# Patient Record
Sex: FEMALE | Race: WHITE
[De-identification: names, ages, dates, MRNs, and addresses within clinical notes are randomized per-mention and may not be internally consistent; named-entity substitution may affect disease eponyms.]

---

## 2019-01-11 ENCOUNTER — HOSPITAL ENCOUNTER (EMERGENCY)
Dept: HOSPITAL 62 - ER | Age: 29
Discharge: HOME | End: 2019-01-11
Payer: OTHER GOVERNMENT

## 2019-01-11 VITALS — DIASTOLIC BLOOD PRESSURE: 59 MMHG | SYSTOLIC BLOOD PRESSURE: 104 MMHG

## 2019-01-11 DIAGNOSIS — R55: ICD-10-CM

## 2019-01-11 DIAGNOSIS — R19.7: Primary | ICD-10-CM

## 2019-01-11 DIAGNOSIS — R11.2: ICD-10-CM

## 2019-01-11 DIAGNOSIS — Z88.6: ICD-10-CM

## 2019-01-11 LAB
ADD MANUAL DIFF: NO
ALBUMIN SERPL-MCNC: 4.8 G/DL (ref 3.5–5)
ALP SERPL-CCNC: 40 U/L (ref 38–126)
ALT SERPL-CCNC: 22 U/L (ref 9–52)
ANION GAP SERPL CALC-SCNC: 10 MMOL/L (ref 5–19)
APPEARANCE UR: (no result)
APTT PPP: (no result) S
AST SERPL-CCNC: 20 U/L (ref 14–36)
BASOPHILS # BLD AUTO: 0.1 10^3/UL (ref 0–0.2)
BASOPHILS NFR BLD AUTO: 0.3 % (ref 0–2)
BILIRUB DIRECT SERPL-MCNC: 0.1 MG/DL (ref 0–0.4)
BILIRUB SERPL-MCNC: 0.8 MG/DL (ref 0.2–1.3)
BILIRUB UR QL STRIP: NEGATIVE
BUN SERPL-MCNC: 15 MG/DL (ref 7–20)
CALCIUM: 9.8 MG/DL (ref 8.4–10.2)
CHLORIDE SERPL-SCNC: 104 MMOL/L (ref 98–107)
CK MB SERPL-MCNC: < 0.22 NG/ML (ref ?–4.55)
CK SERPL-CCNC: 66 U/L (ref 30–135)
CO2 SERPL-SCNC: 26 MMOL/L (ref 22–30)
EOSINOPHIL # BLD AUTO: 0.4 10^3/UL (ref 0–0.6)
EOSINOPHIL NFR BLD AUTO: 2.1 % (ref 0–6)
ERYTHROCYTE [DISTWIDTH] IN BLOOD BY AUTOMATED COUNT: 12.4 % (ref 11.5–14)
GLUCOSE SERPL-MCNC: 122 MG/DL (ref 75–110)
GLUCOSE UR STRIP-MCNC: NEGATIVE MG/DL
HCT VFR BLD CALC: 42.1 % (ref 36–47)
HGB BLD-MCNC: 14.5 G/DL (ref 12–15.5)
KETONES UR STRIP-MCNC: NEGATIVE MG/DL
LYMPHOCYTES # BLD AUTO: 1.8 10^3/UL (ref 0.5–4.7)
LYMPHOCYTES NFR BLD AUTO: 10.1 % (ref 13–45)
MCH RBC QN AUTO: 31.4 PG (ref 27–33.4)
MCHC RBC AUTO-ENTMCNC: 34.4 G/DL (ref 32–36)
MCV RBC AUTO: 91 FL (ref 80–97)
MONOCYTES # BLD AUTO: 0.8 10^3/UL (ref 0.1–1.4)
MONOCYTES NFR BLD AUTO: 4.4 % (ref 3–13)
NEUTROPHILS # BLD AUTO: 15.1 10^3/UL (ref 1.7–8.2)
NEUTS SEG NFR BLD AUTO: 83.1 % (ref 42–78)
NITRITE UR QL STRIP: NEGATIVE
PH UR STRIP: 5 [PH] (ref 5–9)
PLATELET # BLD: 277 10^3/UL (ref 150–450)
POTASSIUM SERPL-SCNC: 4.2 MMOL/L (ref 3.6–5)
PROT SERPL-MCNC: 7.7 G/DL (ref 6.3–8.2)
PROT UR STRIP-MCNC: 30 MG/DL
RBC # BLD AUTO: 4.61 10^6/UL (ref 3.72–5.28)
SODIUM SERPL-SCNC: 139.5 MMOL/L (ref 137–145)
SP GR UR STRIP: 1.03
TOTAL CELLS COUNTED % (AUTO): 100 %
TROPONIN I SERPL-MCNC: < 0.012 NG/ML
UROBILINOGEN UR-MCNC: 2 MG/DL (ref ?–2)
WBC # BLD AUTO: 18.2 10^3/UL (ref 4–10.5)

## 2019-01-11 PROCEDURE — 81001 URINALYSIS AUTO W/SCOPE: CPT

## 2019-01-11 PROCEDURE — 82550 ASSAY OF CK (CPK): CPT

## 2019-01-11 PROCEDURE — 99284 EMERGENCY DEPT VISIT MOD MDM: CPT

## 2019-01-11 PROCEDURE — 82553 CREATINE MB FRACTION: CPT

## 2019-01-11 PROCEDURE — 85025 COMPLETE CBC W/AUTO DIFF WBC: CPT

## 2019-01-11 PROCEDURE — 96374 THER/PROPH/DIAG INJ IV PUSH: CPT

## 2019-01-11 PROCEDURE — 84484 ASSAY OF TROPONIN QUANT: CPT

## 2019-01-11 PROCEDURE — 36415 COLL VENOUS BLD VENIPUNCTURE: CPT

## 2019-01-11 PROCEDURE — 93005 ELECTROCARDIOGRAM TRACING: CPT

## 2019-01-11 PROCEDURE — 71045 X-RAY EXAM CHEST 1 VIEW: CPT

## 2019-01-11 PROCEDURE — 96375 TX/PRO/DX INJ NEW DRUG ADDON: CPT

## 2019-01-11 PROCEDURE — 80053 COMPREHEN METABOLIC PANEL: CPT

## 2019-01-11 PROCEDURE — 96361 HYDRATE IV INFUSION ADD-ON: CPT

## 2019-01-11 PROCEDURE — 84703 CHORIONIC GONADOTROPIN ASSAY: CPT

## 2019-01-11 PROCEDURE — 93010 ELECTROCARDIOGRAM REPORT: CPT

## 2019-01-11 NOTE — RADIOLOGY REPORT (SQ)
EXAM DESCRIPTION: 



XR CHEST 1 VIEW



COMPLETED DATE/TME:  01/11/2019 02:50



CLINICAL HISTORY: 



28 years, Female, syncope



COMPARISON:

None.



NUMBER OF VIEWS:

1



TECHNIQUE:

Portable chest



LIMITATIONS:

None.



FINDINGS:



Heart size is normal. Lungs are clear. No pneumothorax



IMPRESSION:



Negative chest

 



copyright 2011 [employer]- All Rights Reserved

## 2019-01-11 NOTE — ER DOCUMENT REPORT
ED General





- General


Chief Complaint: Syncope


Stated Complaint: BLACKING OUT


Time Seen by Provider: 19 02:54


Notes: 





Patient is a 28-year-old female that presents to the emergency department for 

chief complaint of nausea, vomiting, diarrhea and syncopal episode.  Patient 

states that she got up to go the bathroom early this morning, and had watery 

diarrhea, after getting out of the bathroom and walking back towards the bed, 

she felt lightheaded and told her  that she felt like she is going to 

pass out, and did have a brief syncopal episode for a few seconds.  Her  

was able to catch her, she did not strike her head or injure her neck.  She 

states she has had passing out episodes in the past with is been a few years.  

She is had associated nausea and vomiting, that she had afterwards, and she is 

had multiple episodes of diarrhea since her initial episode.  She states she 

went out to eat last night, and ate a steak quesadilla at a restaurant.  No sick

contacts that she is aware of.  She denies having any recent fever, chills, 

night sweats, abdominal pain, dysuria or hematuria.





Past Medical History: Denies chronic medical conditions


Past Surgical History: Appendectomy, 


Social History: Denies tobacco use, admits to rare alcohol use, denies illicit 

drug use.


Family History: Reviewed and noncontributory for presenting illness


Allergies: Reviewed, see documented allergy list. 





REVIEW OF SYSTEMS:


Other than noted above, the 12 point review of systems was reviewed with the 

patient and were negative, all pertinent findings are included in the HPI.





PHYSICAL EXAMINATION:





Vital signs reviewed, nursing noted reviewed. 





GENERAL: Well-appearing, well-nourished and in no acute distress.





HEAD: Atraumatic, normocephalic.





EYES: Eyes appear normal, extraocular movements intact, sclera anicteric, conj

unctiva are normal.





ENT: nares patent, oropharynx clear without exudates.  Moist mucous membranes.





NECK: Normal range of motion, supple without lymphadenopathy





LUNGS: Breath sounds clear to auscultation bilaterally and equal.  No wheezes 

rales or rhonchi.





HEART: Regular rate and rhythm without murmurs





ABDOMEN: Soft, nontender, normoactive bowel sounds.  No rebound, guarding, or 

rigidity. No masses appreciated.





EXTREMITIES: Nontender, good range of motion, no pitting or edema.  





NEUROLOGICAL: No focal neurological deficits. Moves all extremities 

spontaneously Motor and sensory grossly intact on exam.





PSYCH: Normal mood, normal affect.





SKIN: Warm, Dry, normal turgor, no rashes or lesions noted on exposed skin





TRAVEL OUTSIDE OF THE [place] IN LAST 30 DAYS: No





- Related Data


Allergies/Adverse Reactions: 


                                        





morphine Allergy (Verified 19 02:43)


   











Past Medical History





- Social History


Smoking Status: Never Smoker


Family History: Reviewed & Not Pertinent





Physical Exam





- Vital signs


Vitals: 


                                        











Resp Pulse Ox


 


 11 L  100 


 


 19 02:56  19 02:56














Course





- Re-evaluation


Re-evalutation: 





Patient seen and examined vital signs reviewed. 





Laboratory data and imaging were ordered as appropriate for the patient's 

presenting symptoms and complaint, with consideration of any critical or life 

threatening conditions that may be associated with their obtained history and 

exam as noted above.





Patient was treated with IV fluid bolusing, 2 L total ordered, as well as Zofran

for nausea.  Orthostatic vital signs were ordered, and were negative, patient 

did not have any passing out but she had received a total of 1 L of IV fluids 

prior to obtaining orthostatic vital signs.





Results were reviewed when available and demonstrated leukocytosis, of 18,000, 

which I suspect is from the patient's combined nausea and vomiting, acute phase 

stress reaction versus from gastroenteritis, chemistry unremarkable





The patient was re-evaluated and was improved overall





Evaluation was most consistent with nausea, vomiting diarrhea, likely mild 

dehydration, leading to her syncopal episode, patient overall felt well, after 

fluids was not orthostatic, and felt comfortable discharging home, gave Zofran 

dispense pack, and advised follow-up.





Results were discussed with the patient at this point, after careful 

consideration I feel that that patient can be discharged from the emergency depa

rtment, the patient was educated treatments and reasons to return to the 

emergency department based on their presumed diagnosis as noted above, they were

advised to followup with a primary care physician in 2-3 days. Patient was 

agreeable to plan of care.





*Note is created using voice recognition software and may contain spelling, 

syntax or grammatical errors.








Laboratory











  19





  02:50 02:50 02:50


 


WBC  18.2 H  


 


RBC  4.61  


 


Hgb  14.5  


 


Hct  42.1  


 


MCV  91  


 


MCH  31.4  


 


MCHC  34.4  


 


RDW  12.4  


 


Plt Count  277  


 


Seg Neutrophils %  83.1 H  


 


Lymphocytes %  10.1 L  


 


Monocytes %  4.4  


 


Eosinophils %  2.1  


 


Basophils %  0.3  


 


Absolute Neutrophils  15.1 H  


 


Absolute Lymphocytes  1.8  


 


Absolute Monocytes  0.8  


 


Absolute Eosinophils  0.4  


 


Absolute Basophils  0.1  


 


Sodium   139.5 


 


Potassium   4.2 


 


Chloride   104 


 


Carbon Dioxide   26 


 


Anion Gap   10 


 


BUN   15 


 


Creatinine   1.03 


 


Est GFR ( Amer)   > 60 


 


Est GFR (Non-Af Amer)   > 60 


 


Glucose   122 H 


 


Calcium   9.8 


 


Total Bilirubin   0.8 


 


Direct Bilirubin   0.1 


 


Neonat Total Bilirubin   Not Reportable 


 


Neonat Direct Bilirubin   Not Reportable 


 


Neonat Indirect Bili   Not Reportable 


 


AST   20 


 


ALT   22 


 


Alkaline Phosphatase   40 


 


Creatine Kinase   66 


 


CK-MB (CK-2)    < 0.22


 


Troponin I    < 0.012


 


Total Protein   7.7 


 


Albumin   4.8 


 


Serum HCG, Qual   


 


Urine Color   


 


Urine Appearance   


 


Urine pH   


 


Ur Specific Gravity   


 


Urine Protein   


 


Urine Glucose (UA)   


 


Urine Ketones   


 


Urine Blood   


 


Urine Nitrite   


 


Urine Bilirubin   


 


Urine Urobilinogen   


 


Ur Leukocyte Esterase   


 


Urine WBC (Auto)   


 


Urine RBC (Auto)   


 


Squamous Epi Cells Auto   


 


Urine Mucus (Auto)   


 


Urine Ascorbic Acid   














  19





  02:50 03:55


 


WBC  


 


RBC  


 


Hgb  


 


Hct  


 


MCV  


 


MCH  


 


MCHC  


 


RDW  


 


Plt Count  


 


Seg Neutrophils %  


 


Lymphocytes %  


 


Monocytes %  


 


Eosinophils %  


 


Basophils %  


 


Absolute Neutrophils  


 


Absolute Lymphocytes  


 


Absolute Monocytes  


 


Absolute Eosinophils  


 


Absolute Basophils  


 


Sodium  


 


Potassium  


 


Chloride  


 


Carbon Dioxide  


 


Anion Gap  


 


BUN  


 


Creatinine  


 


Est GFR ( Amer)  


 


Est GFR (Non-Af Amer)  


 


Glucose  


 


Calcium  


 


Total Bilirubin  


 


Direct Bilirubin  


 


Neonat Total Bilirubin  


 


Neonat Direct Bilirubin  


 


Neonat Indirect Bili  


 


AST  


 


ALT  


 


Alkaline Phosphatase  


 


Creatine Kinase  


 


CK-MB (CK-2)  


 


Troponin I  


 


Total Protein  


 


Albumin  


 


Serum HCG, Qual  NEGATIVE 


 


Urine Color   MARYCHUY


 


Urine Appearance   SLIGHTLY-CLOUDY


 


Urine pH   5.0


 


Ur Specific Gravity   1.029


 


Urine Protein   30 H


 


Urine Glucose (UA)   NEGATIVE


 


Urine Ketones   NEGATIVE


 


Urine Blood   NEGATIVE


 


Urine Nitrite   NEGATIVE


 


Urine Bilirubin   NEGATIVE


 


Urine Urobilinogen   2.0 H


 


Ur Leukocyte Esterase   NEGATIVE


 


Urine WBC (Auto)   2


 


Urine RBC (Auto)   1


 


Squamous Epi Cells Auto   2


 


Urine Mucus (Auto)   MANY


 


Urine Ascorbic Acid   NEGATIVE











                                        





Chest X-Ray  19 02:50


IMPRESSION:


 


Negative chest


 


 


copyright 2011 [employer]- All Rights Reserved


 

















- Vital Signs


Vital signs: 


                                        











Temp Pulse Resp BP Pulse Ox


 


 99.8 F   99   18   104/59 L  97 


 


 19 05:37  19 03:46  19 05:37  19 04:01  19 05:37














- Laboratory


Result Diagrams: 


                                 19 02:50





                                 19 02:50


Laboratory results interpreted by me: 


                                        











  19





  02:50 02:50 03:55


 


WBC  18.2 H  


 


Seg Neutrophils %  83.1 H  


 


Lymphocytes %  10.1 L  


 


Absolute Neutrophils  15.1 H  


 


Glucose   122 H 


 


Urine Protein    30 H


 


Urine Urobilinogen    2.0 H














- EKG Interpretation by Me


Additional EKG results interpreted by me: 


EKG demonstrates sinus rhythm with a ventricular rate of 96 bpm, normal axis, 

normal intervals, no evidence of acute ischemia on this EKG.








Discharge





- Discharge


Clinical Impression: 


 Acute diarrhea





Nausea and vomiting


Qualifiers:


 Vomiting type: unspecified Vomiting Intractability: non-intractable Qualified 

Code(s): R11.2 - Nausea with vomiting, unspecified





Syncope


Qualifiers:


 Syncope type: unspecified Qualified Code(s): R55 - Syncope and collapse





Condition: Stable


Disposition: HOME, SELF-CARE


Instructions:  Diarrhea, Nonspecific ([facility]), Vomiting ([facility])


Additional Instructions: 


Please keep yourself hydrated as much as possible, and maintain a bland diet for

the next few days, and take the Zofran as needed every 6-8 hours to help with 

nausea, and to keep yourself hydrated.  You should be drinking around 2 L of a 

clear liquid daily, avoid coffee, and caffeine as much as possible.


Referrals: 


DENISE SILVA MD [[facility]] - Follow up in 3-5 days


(or your primary care. 


)